# Patient Record
Sex: FEMALE | Race: WHITE | NOT HISPANIC OR LATINO | Employment: FULL TIME | URBAN - METROPOLITAN AREA
[De-identification: names, ages, dates, MRNs, and addresses within clinical notes are randomized per-mention and may not be internally consistent; named-entity substitution may affect disease eponyms.]

---

## 2017-05-01 ENCOUNTER — HOSPITAL ENCOUNTER (OUTPATIENT)
Dept: RADIOLOGY | Age: 57
Discharge: HOME | End: 2017-05-01
Payer: COMMERCIAL

## 2017-05-01 DIAGNOSIS — Z12.31 ENCOUNTER FOR SCREENING MAMMOGRAM FOR MALIGNANT NEOPLASM OF BREAST: ICD-10-CM

## 2017-05-01 PROCEDURE — G0202 SCR MAMMO BI INCL CAD: HCPCS

## 2017-10-30 ENCOUNTER — APPOINTMENT (OUTPATIENT)
Dept: URGENT CARE | Age: 57
End: 2017-10-30
Payer: OTHER MISCELLANEOUS

## 2017-10-30 PROCEDURE — G0383 LEV 4 HOSP TYPE B ED VISIT: HCPCS | Performed by: PREVENTIVE MEDICINE

## 2017-10-30 PROCEDURE — 99284 EMERGENCY DEPT VISIT MOD MDM: CPT | Performed by: PREVENTIVE MEDICINE

## 2017-10-31 ENCOUNTER — APPOINTMENT (OUTPATIENT)
Dept: URGENT CARE | Age: 57
End: 2017-10-31
Payer: OTHER MISCELLANEOUS

## 2017-10-31 PROCEDURE — 99213 OFFICE O/P EST LOW 20 MIN: CPT | Performed by: PREVENTIVE MEDICINE

## 2017-11-07 ENCOUNTER — APPOINTMENT (OUTPATIENT)
Dept: URGENT CARE | Age: 57
End: 2017-11-07
Payer: OTHER MISCELLANEOUS

## 2017-11-07 PROCEDURE — 99213 OFFICE O/P EST LOW 20 MIN: CPT | Performed by: PREVENTIVE MEDICINE

## 2017-12-05 ENCOUNTER — ALLSCRIPTS OFFICE VISIT (OUTPATIENT)
Dept: OTHER | Facility: OTHER | Age: 57
End: 2017-12-05

## 2017-12-05 ENCOUNTER — LAB REQUISITION (OUTPATIENT)
Dept: LAB | Facility: HOSPITAL | Age: 57
End: 2017-12-05
Payer: COMMERCIAL

## 2017-12-05 DIAGNOSIS — Z11.51 ENCOUNTER FOR SCREENING FOR HUMAN PAPILLOMAVIRUS (HPV): ICD-10-CM

## 2017-12-05 DIAGNOSIS — Z01.419 ENCOUNTER FOR GYNECOLOGICAL EXAMINATION WITHOUT ABNORMAL FINDING: ICD-10-CM

## 2017-12-05 PROCEDURE — 87624 HPV HI-RISK TYP POOLED RSLT: CPT | Performed by: OBSTETRICS & GYNECOLOGY

## 2017-12-05 PROCEDURE — G0145 SCR C/V CYTO,THINLAYER,RESCR: HCPCS | Performed by: OBSTETRICS & GYNECOLOGY

## 2017-12-06 NOTE — PROGRESS NOTES
Assessment    1  Encounter for gynecological examination without abnormal finding (V72 31) ([id number] [id number])    Plan  Dense breast tissue, Encounter for screening mammogram for malignant neoplasm ofbreast    · MAMMO SCREENING BILATERAL W 3D & CAD; Status:Hold For -Scheduling,Retrospective Authorization; Requested for:[id number]; Perform:[facility]; PMV:[id number]; Last Updated Sunday An; 12/5/2017 3:44:04 PM;Ordered;breast tissue, Encounter for screening mammogram for malignant neoplasm of breast; Ordered By:Evita Younger;  Encounter for gynecological examination without abnormal finding    · Follow-up visit in 1 year Evaluation and Treatment  Follow-up  Status: Hold For -Scheduling  Requested for: [id number]   Ordered;Encounter for gynecological examination without abnormal finding; Ordered By: Laquita Hicks Performed:  Due: [id number]   · Call [phone] if: You have any bleeding from the vagina ; Status:Complete;  Done: 77IEG5544 04:06PM   Ordered;for gynecological examination without abnormal finding; Ordered By:Evita Younger;   · We recommend that you follow these steps to lower your risk of osteoporosis  ;Status:Complete;   Done: 76OFX6525 32:19TO   Ordered;for gynecological examination without abnormal finding; Ordered By:Evita Younger;  Encounter for gynecological examination without abnormal finding, Encounter forscreening for human papillomavirus (HPV)    · (1) THIN PREP PAP WITH IMAGING; Status: In Progress - Specimen/DataCollected,Retrospective Authorization;   Done: [id number]   Perform:[facility] In Office Collection; IUH:[id number]; Last Updated Sunday An; 12/5/2017 3:45:06 PM;Ordered;for gynecological examination without abnormal finding, Encounter for screening for human papillomavirus (HPV); Ordered By:Evita Younger;   Maturation index required? : No  :   HPV? : Regardless of Interpretation    Discussion/Summary  health maintenance visit the risks and benefits of cervical cancer screening were discussed HPV and Pap Co-testing Done Today Breast cancer screening: the risks and benefits of breast cancer screening were discussed, monthly self breast exam was advised and mammogram has been ordered  Colorectal cancer screening: the risks and benefits of colorectal cancer screening were discussed and colorectal cancer screening is current  The patient has the current Goals: Well-woman care  The patent has the current Barriers: None  Patient is able to Self-Care  Self Referrals: No      Chief Complaint  Yearly-no complaints  History of Present Illness  HPI: Patient returns for annual exam  She has been well since her last visit  She denies postmenopausal bleeding or other menopausal symptoms  She is aware that she needs Pap with HPV Co testing today so that she does not go beyond 5 years  She is otherwise up-to-date on her health maintenance  daughter is getting  in May  There stressed with wedding planning  She is also stressed as her  had a very bad pneumonia at the beginning of 2016 and has just returned to work secondary to loss of a  for their business  GYN HM, Adult Female [facility]: The patient is being seen for a gynecology evaluation  The last health maintenance visit was 1 year(s) ago  General Health:  Lifestyle:  She exercises regularly  She exercises 3 or more times per week  Exercise includes walking-- and-- strength training -- She does not use tobacco  The patient has never smoked cigarettes  -- She consumes alcohol  She reports occasional alcohol use  She typically drinks hard liquor  -- She denies drug use  Reproductive health: the patient is postmenopausal--   she is sexually active  She is monogamous with a male partner  -- pregnancy history: G 3P 4(multiple birth pregnancies: 1 )     Screening: Cervical cancer screening includes a pap smear performed 6/18/2013,-- human papilloma virus screening performed 2013-- and-- negative pap / negative HPV  Breast cancer screening includes a mammogram performed 2017-- and-- negative  Colorectal cancer screening includes cologuard test normal       Review of Systems  no pelvic pain,-- no pelvic pressure,-- no vaginal pain,-- no vaginal discharge,-- no vaginal itching,-- no postmenopausal bleeding,-- no dysuria,-- no bladder pain,-- no burning sensation during urination,-- no change in urinary frequency-- and-- no urinary loss of control  Additional findings include Denies changes in bowel habits  Active Problems    1  Encounter for gynecological examination without abnormal finding (V72 31) (Z01 [id number])   2  Encounter for screening mammogram for malignant neoplasm of breast (V76 12) (Z12 31)    Past Medical History   · History of menorrhagia (V13 29) (Z87 42)   · History of migraine (V12 49) (Z86 69)   · History of uterine leiomyoma (V13 29) (Z86 018)   · History of Menopause (627 2) (Z78 0)   · History of Summary Of Previous Pregnancies  3  (Total No )   · History of Twin Birth (V33)   · History of Vaginal dryness (625 8) (N89 8)   · History of , delivered (654 21) (O34 219)    The active problems and past medical history were reviewed and updated today  Surgical History   · History of Appendectomy   · History of  Section   · History of  Section   · History of Dilation And Curettage   · History of Endometrial Biopsy By Suction   · History of Hysteroscopy   · History of Oral Surgery Tooth Extraction   · History of Treatment Of Lower Leg Fracture   · History of Tubal Ligation    The surgical history was reviewed and updated today         Family History  Mother    · Family history of Breast Cancer (V16 3)   · Family history of fibromyalgia (V17 89) (Z82 69)   · Family history of Hypertension (V17 49)  Father    · Family history of chronic obstructive pulmonary disease (V17 6) (Z82 5)   · Family history of Hypertension (V17 49)  Maternal Uncle    · Family history of malignant neoplasm of stomach (V16 0) (Z80 0)    The family history was reviewed and updated today  Social History     · Being A Social Drinker   · Currently sexually active   · Daily Coffee Consumption (3  Cups/Day)   · Drinks hard liquor (V49 89) (Z78 9)   · Denied: History of Drug Use   · Exercising Regularly   · Never A Smoker   · Recreational Activities Walking   · Weight training  The social history was reviewed and updated today  Current Meds   1  Calcium 600 + D TABS Recorded   2  [employer] Magnesium TABS; Therapy: (Recorded:05Dec2017) to Recorded   3  [employer] Vitamin E CAPS Recorded   4  Daily Value Multivitamin TABS Recorded   5  [facility] Vitamin B12 TABS; Therapy: (Recorded:23Nov2015) to Recorded   6  Imitrex TABS; Therapy: (Recorded:05Dec2017) to Recorded   7  Vitamin C TABS; Therapy: (Recorded:23Nov2015) to Recorded    Allergies  1  No Known Allergies    Vitals   Recorded: 11VNU4928 92:18DF   Systolic 130, LUE, Sitting   Diastolic 74, LUE, Sitting   Height 5 ft 1 3 in   Weight 110 lb    BMI Calculated 20 58   BSA Calculated 1 47   LMP        Physical Exam   Constitutional  General appearance: No acute distress, well appearing and well nourished  Neck  Neck: Normal, supple, trachea midline, no masses  Thyroid: Normal, no thyromegaly  Genitourinary  External genitalia: Normal and no lesions appreciated  Vagina: Normal, no lesions or dryness appreciated  Urethra: Normal    Urethral meatus: Normal    Bladder: Normal, soft, non-tender and no prolapse or masses appreciated  Cervix: Normal, no palpable masses  Uterus: Normal, non-tender, not enlarged, and no palpable masses  Adnexa/parametria: Normal, non-tender and no fullness or masses appreciated  Chest  Breasts: Normal and no dimpling or skin changes noted  Abdomen  Abdomen: Normal, non-tender, and no organomegaly noted     Lymphatic  Palpation of lymph nodes in neck, axillae, groin and/or other locations: No lymphadenopathy or masses noted         Signatures   Electronically signed by : KARLA Morgan ; Dec  5 2017  4:07PM EST                       (Author)

## 2017-12-08 LAB — HPV RRNA GENITAL QL NAA+PROBE: NORMAL

## 2017-12-12 LAB
LAB AP GYN PRIMARY INTERPRETATION: NORMAL
Lab: NORMAL

## 2018-01-23 VITALS
SYSTOLIC BLOOD PRESSURE: 110 MMHG | DIASTOLIC BLOOD PRESSURE: 74 MMHG | WEIGHT: 110 LBS | HEIGHT: 61 IN | BODY MASS INDEX: 20.77 KG/M2

## 2018-05-01 DIAGNOSIS — R92.2 INCONCLUSIVE MAMMOGRAM: ICD-10-CM

## 2018-05-01 DIAGNOSIS — Z12.31 ENCOUNTER FOR SCREENING MAMMOGRAM FOR MALIGNANT NEOPLASM OF BREAST: ICD-10-CM

## 2018-05-07 ENCOUNTER — TRANSCRIBE ORDERS (OUTPATIENT)
Dept: RADIOLOGY | Facility: CLINIC | Age: 58
End: 2018-05-07

## 2018-05-09 ENCOUNTER — HOSPITAL ENCOUNTER (OUTPATIENT)
Dept: RADIOLOGY | Age: 58
Discharge: HOME | End: 2018-05-09
Payer: COMMERCIAL

## 2018-05-09 DIAGNOSIS — R92.2 INCONCLUSIVE MAMMOGRAM: ICD-10-CM

## 2018-05-09 DIAGNOSIS — Z12.31 ENCOUNTER FOR SCREENING MAMMOGRAM FOR MALIGNANT NEOPLASM OF BREAST: ICD-10-CM

## 2018-05-09 PROCEDURE — 77067 SCR MAMMO BI INCL CAD: CPT

## 2018-05-09 PROCEDURE — 77063 BREAST TOMOSYNTHESIS BI: CPT
